# Patient Record
Sex: MALE | Race: WHITE
[De-identification: names, ages, dates, MRNs, and addresses within clinical notes are randomized per-mention and may not be internally consistent; named-entity substitution may affect disease eponyms.]

---

## 2018-07-02 ENCOUNTER — HOSPITAL ENCOUNTER (OUTPATIENT)
Dept: HOSPITAL 80 - FED | Age: 43
Setting detail: OBSERVATION
Discharge: HOME | End: 2018-07-02
Attending: INTERNAL MEDICINE | Admitting: INTERNAL MEDICINE
Payer: SELF-PAY

## 2018-07-02 VITALS — DIASTOLIC BLOOD PRESSURE: 74 MMHG | SYSTOLIC BLOOD PRESSURE: 124 MMHG

## 2018-07-02 DIAGNOSIS — F17.200: ICD-10-CM

## 2018-07-02 DIAGNOSIS — Z95.5: ICD-10-CM

## 2018-07-02 DIAGNOSIS — I11.0: ICD-10-CM

## 2018-07-02 DIAGNOSIS — E78.5: ICD-10-CM

## 2018-07-02 DIAGNOSIS — I25.110: Primary | ICD-10-CM

## 2018-07-02 LAB
INR PPP: 0.93 (ref 0.83–1.16)
PLATELET # BLD: 236 10^3/UL (ref 150–400)
PROTHROMBIN TIME: 12.7 SEC (ref 12–15)

## 2018-07-02 PROCEDURE — B2111ZZ FLUOROSCOPY OF MULTIPLE CORONARY ARTERIES USING LOW OSMOLAR CONTRAST: ICD-10-PCS | Performed by: INTERNAL MEDICINE

## 2018-07-02 PROCEDURE — B2151ZZ FLUOROSCOPY OF LEFT HEART USING LOW OSMOLAR CONTRAST: ICD-10-PCS | Performed by: INTERNAL MEDICINE

## 2018-07-02 PROCEDURE — 4A023N7 MEASUREMENT OF CARDIAC SAMPLING AND PRESSURE, LEFT HEART, PERCUTANEOUS APPROACH: ICD-10-PCS | Performed by: INTERNAL MEDICINE

## 2018-07-02 PROCEDURE — C1769 GUIDE WIRE: HCPCS

## 2018-07-02 NOTE — GHP
[f rep st]



                                                            HISTORY AND PHYSICAL





DATE OF ADMISSION:  07/02/2018



CHIEF COMPLAINT:  Chest pain.



HISTORY:  This is a 42-year-old man with a past medical history of coronary artery disease status pos
t stent approximately 11 years ago who presents with several weeks of increasing chest pain.  The pat
tosin notes that he has pain in his chest that feels like heartburn that also involves his left arm an
d jaw.  He notes this has been progressive over the last several weeks and much more intense yesterda
y.  He notes that this is very similar to the pain he had when he had his cardiac event many years ag
o though less intense.  He is currently working here, but originally from [place] where he has had t
he majority of his care.  He does note that some of his symptoms increased when he was exerting himse
lf hiking in the [place] near [facility].  He did note that the pain improved when he was able t
o sit.



PAST MEDICAL HISTORY:  Includes.

1.  Coronary artery disease.

2.  Hypertension.

3.  Hyperlipidemia.



PAST SURGICAL HISTORY:  Angiography and stent placement.



SOCIAL HISTORY:  Patient is a 2-pack-per-day smoker with a greater than 80 pack-year smoking history.
  He works as a .  He has 3 children, 2 of whom are here with him in [place].  He drinks beer 
daily, generally 3-4 beers per day.



FAMILY HISTORY:  Grandmother with cardiac disease.



REVIEW OF SYSTEMS:  10-point review of systems obtained, negative except as per HPI.



HOME MEDICATIONS:  Ibuprofen.



ALLERGIES:  No known drug allergies.



PHYSICAL EXAMINATION:  VITAL SIGNS:  /95, heart rate 72, respiratory rate 16, O2 sats 94% on ro
om air, temperature 36.7.  GENERAL APPEARANCE:  This is a well-developed, well-nourished man.  He is 
awake and alert.  He is in no acute distress.  EYES:  Anicteric.  HENT:  Oropharynx clear.  CARDIOVAS
CULAR:  Regular rate and rhythm, no MRG.  PULMONARY:  CTA bilaterally.  Normal work of breathing.  AB
DOMEN:  Soft, nontender, nondistended.  EXTREMITIES:  No clubbing, cyanosis, or edema.  SKIN:  Warm, 
dry, well perfused.  NEURO/PSYCH:  Oriented, appropriate, pleasant.



CLINICAL DATA:  Labs reviewed.  Troponin is negative.  White blood cell count is 11.7.  Chemistry is 
remarkable only for a glucose of 139. 



EKG personally reviewed and interpreted shows a normal sinus rhythm with Q-waves in II, III, aVF.



ASSESSMENT/PLAN:  This is a 42-year-old man with a past medical history of coronary artery disease pr
esenting with chest pain with radiation to left arm and jaw.

1.  Chest pain.  Unfortunately, this is his typical anginal symptoms, and given his history concernin
g for acute coronary syndrome, Cardiology has been consulted and is taking the patient for coronary a
ngiography.  He has not been on any cardiac medications for the last several years.  Further recommen
dations to follow pending his catheterization report.

2.  Hypertension.  The patient has mildly elevated blood pressure since arrival, again in the setting
 of being off all of his cardiac medications.  Will likely be restarted on beta-blocker therapy prior
 to discharge pending again cath report.

3.  Hyperglycemia.  The patient with concerns that he might have diabetes as he states he has issues 
when he has not eaten for some time.  We will check a hemoglobin A1c given his random glucose of 140.


4.  Tobacco use.  Recommending cessation.  Will provide nicotine patch and gum while inhouse.  

5.  Inpatient status given concerns for presentation consistent with acute coronary syndrome.  The yola pattonnt will require greater than 48-hour stay for evaluation and management of above. 



Patient is new to my care.  Care plan reviewed with the ER physician.





Job #:  [id number]

## 2018-07-02 NOTE — EDPHY
H & P


Stated Complaint: 2 weeks intermittent cp with hx cardiac stents/off blood 

thinners/ran out


Time Seen by Provider: 07/02/18 10:29





- Personal History


Current Tetanus Diphtheria and Acellular Pertussis (TDAP): Unsure





- Medical/Surgical History


Hx Asthma: No


Hx Chronic Respiratory Disease: No


Hx Diabetes: No


Hx Cardiac Disease: Yes


Hx Renal Disease: No


Hx Cirrhosis: No


Hx Alcoholism: No


Hx HIV/AIDS: No


Hx Splenectomy or Spleen Trauma: No


Other PMH: cardiac stent/htn off meds





- Social History


Smoking Status: Current every day smoker


Constitutional: 


 Initial Vital Signs











Temperature (C)  36.5 C   07/02/18 10:20


 


Heart Rate  85   07/02/18 10:20


 


Respiratory Rate  18   07/02/18 10:20


 


Blood Pressure  161/104 H  07/02/18 10:20


 


O2 Sat (%)  95   07/02/18 10:20








 











O2 Delivery Mode               Room Air














Allergies/Adverse Reactions: 


 





No Known Allergies Allergy (Unverified 07/02/18 10:20)


 








Home Medications: 














 Medication  Instructions  Recorded


 


Ibuprofen [Motrin (*)] 200 mg PO DAILY PRN 07/02/18














Medical Decision Making


ED Course/Re-evaluation: 





CHIEF COMPLAINT: Chest pain





HISTORY OF PRESENT ILLNESS:  


The patient is a 43 y/o male with a history of a right coronary artery cardiac 

stent complaining of intermittent chest pain for the past 2 weeks. In 2006 he 

had an angioplasty and cardiac stent placed after developing severe heartburn 

and left arm pain. Around 1 year ago he stopped taking Plavix, Metoprolol, and 

baby Aspirin as he ran out. Several days ago he developed mild heartburn and 

left arm pain. Yesterday he was hiking which caused the symptoms worsen; he 

also developed jaw pain. The pain typically occurs in the morning or while 

exerting himself. Currently he is in no pain. Denies headache, shortness of 

breath, abdominal pain, urinary or bowel complaints, fever, numbness, 

paresthesias.





REVIEW OF SYSTEMS:  





A 10 point review of systems was performed and is negative with the exception 

of the elements mentioned in the history of present illness.





PHYSICAL EXAM:  





HR, BP, O2 Sat, RR.  Temp noted


General Appearance:  Alert, well hydrated, appropriate, and non-toxic appearing.


Head:  Atraumatic without scalp tenderness or obvious injury


Eyes:  Pupils equal, round, reactive to light and accommodation, EOMI, no trauma

, no injection.


Ears:  Clear bilaterally, no perforation, normal landmarks


Nose:  Atraumatic, no rhinorrhea, clear.


Throat:  There is no erythema or exudates, no lesions, normal tonsils, mucus 

membranes moist.


Neck:  Supple, nontender, no lymphadenopathy.


Respiratory:  No retractions, no distress, no wheezes, and no accessory muscle 

use.  Lungs are clear to auscultation bilaterally.


Cardiovascular:  Regular rate and rhythm, no murmurs, rubs, or gallops. 

Bilateral carotid, radial, dorsalis pedis, and posterior tibial pulses intact. 

Good capillary refill all extremities.


Gastrointestinal:  Abdomen is soft, nontender, non-distended, no masses, no 

rebound, no guarding, no peritoneal signs.


Musculoskeletal:  Normal active ROM of all extremities, atraumatic.


Neurological:  Alert, appropriate, and interactive. Non-focal neuro.


Skin:  No rashes, good turgor, no nodules on palpation.





Past medical history: Hypertension


Past surgical history: Cardiac stent (placed in [place] in 2006), angioplasty


Family history: Denies


Social history: Originally from [place], employed, single





DIAGNOSTICS/PROCEDURES/CRITICAL CARE TIME:  





EKG: The 12 lead EKG was interpreted by myself as sinus rhythm with a rate of 78

, inferior infarct. See hard copy and/or "tracemaster" electronic copy for 

interpretation.





DIFFERENTIAL DIAGNOSIS:   


The differential diagnosis for the patient's chest pain included but was not 

limited to acute coronary syndrome, myocardial ischemia, pulmonary embolus, 

chest wall pain, pleural inflammation, and pulmonary infectious causes.





MEDICAL DECISION MAKING: 


The patient is a 43 y/o male with a history of a right coronary artery cardiac 

stent presenting with intermittent chest pain for the past 2 weeks. He has not 

been taken his prescriptions for the past year. Labs and EKG ordered; 324mg PO 

Aspirin administered. 





1030: I interpreted EKG as sinus rhythm with a rate of 78, inferior infarct.





1115: Patients troponin is negative.





1122: Consulted with hospitalist service, Dr. Palacios accepts admission of 

this patient to the PCU.





1127: Consulted with Dr. Fernandez, cardiologist, regarding this patient. He agrees 

to consult on this patient during his admission.





1145: Reassessed patient and discussed laboratory and imaging studies. He is 

comfortable with plan for admission.





1215: Consulted with Dr. Fernandez regarding patient who most likely has an 

unstable angina.








- Data Points


Laboratory Results: 


 Laboratory Results





 07/02/18 10:40 





 07/02/18 10:40 





 











  07/02/18 07/02/18 07/02/18





  10:59 10:40 10:40


 


WBC      11.17 10^3/uL H 10^3/uL





     (3.80-9.50) 


 


RBC      5.39 10^6/uL 10^6/uL





     (4.40-6.38) 


 


Hgb      17.3 g/dL g/dL





     (13.7-17.5) 


 


Hct      49.8 % %





     (40.0-51.0) 


 


MCV      92.4 fL fL





     (81.5-99.8) 


 


MCH      32.1 pg pg





     (27.9-34.1) 


 


MCHC      34.7 g/dL g/dL





     (32.4-36.7) 


 


RDW      13.0 % %





     (11.5-15.2) 


 


Plt Count      236 10^3/uL 10^3/uL





     (150-400) 


 


MPV      10.0 fL fL





     (8.7-11.7) 


 


Neut % (Auto)      65.5 % %





     (39.3-74.2) 


 


Lymph % (Auto)      21.9 % %





     (15.0-45.0) 


 


Mono % (Auto)      8.1 % %





     (4.5-13.0) 


 


Eos % (Auto)      2.9 % %





     (0.6-7.6) 


 


Baso % (Auto)      0.9 % %





     (0.3-1.7) 


 


Nucleat RBC Rel Count      0.0 % %





     (0.0-0.2) 


 


Absolute Neuts (auto)      7.31 10^3/uL H 10^3/uL





     (1.70-6.50) 


 


Absolute Lymphs (auto)      2.45 10^3/uL 10^3/uL





     (1.00-3.00) 


 


Absolute Monos (auto)      0.91 10^3/uL H 10^3/uL





     (0.30-0.80) 


 


Absolute Eos (auto)      0.32 10^3/uL 10^3/uL





     (0.03-0.40) 


 


Absolute Basos (auto)      0.10 10^3/uL 10^3/uL





     (0.02-0.10) 


 


Absolute Nucleated RBC      0.00 10^3/uL 10^3/uL





     (0-0.01) 


 


Immature Gran %      0.7 % %





     (0.0-1.1) 


 


Immature Gran #      0.08 10^3/uL 10^3/uL





     (0.00-0.10) 


 


Sodium    136 mEq/L mEq/L  





    (135-145)  


 


Potassium    4.1 mEq/L mEq/L  





    (3.3-5.0)  


 


Chloride    103 mEq/L mEq/L  





    ()  


 


Carbon Dioxide    21 mEq/l L mEq/l  





    (22-31)  


 


Anion Gap    12 mEq/L mEq/L  





    (8-16)  


 


BUN    7 mg/dL mg/dL  





    (7-23)  


 


Creatinine    0.9 mg/dL mg/dL  





    (0.7-1.3)  


 


Estimated GFR    > 60   





    


 


Glucose    139 mg/dL H mg/dL  





    ()  


 


Calcium    9.8 mg/dL mg/dL  





    (8.5-10.4)  


 


POC Troponin I  0.01 ng/mL ng/mL    





   (0.00-0.08)   


 


NT-Pro-B Natriuret Pep    51 pg/mL pg/mL  





    (0-125)  











Medications Given: 


 








Discontinued Medications





Aspirin (Aspirin)  324 mg PO EDNOW ONE


   Stop: 07/02/18 11:04


   Last Admin: 07/02/18 11:04 Dose:  324 mg





Point of Care Test Results: 


 Chemistry











  07/02/18





  10:59


 


POC Troponin I  0.01 ng/mL ng/mL





   (0.00-0.08) 














Departure





- Departure


Disposition: [facility] Inpatient Acute


Clinical Impression: 


 Acute coronary syndrome





Chest pain


Qualifiers:


 Chest pain type: other chest pain Qualified Code(s): R07.89 - Other chest pain





Condition: Fair


Referrals: 


NONE *PRIMARY CARE P,. [Primary Care Provider] - As per Instructions


Report Scribed for: Tj Mejia


Report Scribed by: Nelia Jolly


Date of Report: 07/02/18


Time of Report: 10:33

## 2018-07-02 NOTE — CPEKG
Heart Rate: 78

RR Interval: 769

P-R Interval: 172

QRSD Interval: 84

QT Interval: 380

QTC Interval: 433

P Axis: 51

QRS Axis: 7

T Wave Axis: 10

EKG Severity - ABNORMAL ECG -

EKG Impression: SINUS RHYTHM

EKG Impression: INFERIOR INFARCT, AGE INDETERMINATE

Electronically Signed By: Tj Mejia 02-Jul-2018 13:15:52

## 2018-07-02 NOTE — PDCARCONS
Cardiology Consult


Reason for Consult: Known CAD, current presentation with chest discomfort.


Chief Complaint: "Heartburn.


Requesting Physician: Dr. Tj Mejia.


History of Present Illness: 





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 

area.  He has known coronary artery disease.  Back in December of 2006 he 

presented with an inferior infarct in initially underwent balloon angioplasty 

of the right coronary artery.  Approximately 3 months later he was brought back 

and underwent coronary stenting of the same vessel.  He has done well since 

then.  Unfortunately, he continues to smoke upwards of 2 packs of cigarettes 

daily.  Currently smoking about a half a pack a day.  Additionally, about 2 

years ago, he stopped taking his medications.





He has been in the local area here for about a month.  Shortly after arrival he 

noted that his breathing was not as good as it usually is back in [place].  He 

was experiencing modest symptoms of exertional dyspnea.  Additionally, he was 

experiencing intermittent episodes of "heartburn."These are described as 

burning precordial chest discomfort episodes that last anywhere from 30-60 

minutes.  These would occur on a daily basis and were associated with radiation 

of this discomfort both to the jaw and throat as well as to the left upper 

extremity.  Usually these resolved spontaneously.  About a week ago he 

experienced a holiday from these symptoms.  These symptoms began again on 

Friday.  He noted a particularly bothersome episode this last Sunday when he 

was hiking with his children.  They were walking around to trail near [facility] when he experienced similar symptoms.  He was able to complete the 

walk and went back to his truck where he sat and rested until his symptoms 

resolved.  He had another episode this morning which was similar to the other 

events.  As result he came to the emergency department.





With these episodes he had no symptoms of shortness of breath, nausea, vomiting 

or diaphoresis.  The symptoms are similar to those that he experienced back in 

December of 2006 at the time of his myocardial infarction.  He has not had any 

dizziness or lightheadedness.  He notes no episodes of palpitations.  He has 

not had any lower extremity swelling.  The pain is not respiratory phasic.  

There is no history of fever, chills or sweats.  He has not had cough, sputum 

production or hemoptysis.  The pain was not described as ripping or tearing.





On arrival to the emergency department he was hemodynamically stable, pain-free 

and had an ECG without dynamic ST/T changes.





History Information





- Allergies/Home Medication List


Allergies/Adverse Reactions: 








No Known Allergies Allergy (Unverified 07/02/18 10:20)


 





Home Medications: 








Ibuprofen [Motrin (*)] 200 mg PO DAILY PRN 07/02/18 [Last Taken 07/01/18]





I have personally reviewed and updated: family history, medical history, social 

history, surgical history


Past Medical History: 


Coronary artery disease, hypertension, hyperlipidemia.





- Surgical History


Additional surgical history: None significant





- Social History


Smoking Status: Current every day smoker


Alcohol Use: Other (He drinks 3-4 beers on a daily basis.  He does not use hard 

liquor.  He has not used drugs in over 10 years.)





Cardiac History





- Cardiac History


Past Cardiac History: CAD, PCI





Physical Exam


Physical Exam: 

















Temp Pulse Resp BP Pulse Ox


 


 36.7 C   72   16   147/95 H  94 


 


 07/02/18 13:13  07/02/18 13:13  07/02/18 13:13  07/02/18 13:13  07/02/18 13:13











Constitutional: no apparent distress, appears nourished, not in pain


Eyes: PERRL, anicteric sclera, EOMI


Ears, Nose, Mouth, Throat: moist mucous membranes, hearing normal, ears appear 

normal, no oral mucosal ulcers


Cardiovascular: regular rate and rhythym, no murmur, rub, or gallop, No edema


Respiratory: no respiratory distress, no rales or rhonchi, clear to auscultation


Gastrointestinal: normoactive bowel sounds, soft, non-tender abdomen, no 

palpable masses


Genitourinary: no bladder fullness, no bladder tenderness


Skin: warm, normal color, no rashes or abrasions, no fluctuance, no induration, 

No mottled


Musculoskeletal: full muscle strength, no muscle tenderness, normal joint ROM, 

no joint effusions


Psychiatric: interacting appropriately, not anxious, not encephalopathic, 

thought process linear


Lymph, Heme, Immunologic: no cervical LAD, no supraclavicular LAD





Lab and Imaging





 07/02/18 10:40





 07/02/18 10:40














WBC  11.17 10^3/uL (3.80-9.50)  H  07/02/18  10:40    


 


RBC  5.39 10^6/uL (4.40-6.38)   07/02/18  10:40    


 


Hgb  17.3 g/dL (13.7-17.5)   07/02/18  10:40    


 


Hct  49.8 % (40.0-51.0)   07/02/18  10:40    


 


MCV  92.4 fL (81.5-99.8)   07/02/18  10:40    


 


MCH  32.1 pg (27.9-34.1)   07/02/18  10:40    


 


MCHC  34.7 g/dL (32.4-36.7)   07/02/18  10:40    


 


RDW  13.0 % (11.5-15.2)   07/02/18  10:40    


 


Plt Count  236 10^3/uL (150-400)   07/02/18  10:40    


 


MPV  10.0 fL (8.7-11.7)   07/02/18  10:40    


 


Neut % (Auto)  65.5 % (39.3-74.2)   07/02/18  10:40    


 


Lymph % (Auto)  21.9 % (15.0-45.0)   07/02/18  10:40    


 


Mono % (Auto)  8.1 % (4.5-13.0)   07/02/18  10:40    


 


Eos % (Auto)  2.9 % (0.6-7.6)   07/02/18  10:40    


 


Baso % (Auto)  0.9 % (0.3-1.7)   07/02/18  10:40    


 


Nucleat RBC Rel Count  0.0 % (0.0-0.2)   07/02/18  10:40    


 


Absolute Neuts (auto)  7.31 10^3/uL (1.70-6.50)  H  07/02/18  10:40    


 


Absolute Lymphs (auto)  2.45 10^3/uL (1.00-3.00)   07/02/18  10:40    


 


Absolute Monos (auto)  0.91 10^3/uL (0.30-0.80)  H  07/02/18  10:40    


 


Absolute Eos (auto)  0.32 10^3/uL (0.03-0.40)   07/02/18  10:40    


 


Absolute Basos (auto)  0.10 10^3/uL (0.02-0.10)   07/02/18  10:40    


 


Absolute Nucleated RBC  0.00 10^3/uL (0-0.01)   07/02/18  10:40    


 


Immature Gran %  0.7 % (0.0-1.1)   07/02/18  10:40    


 


Immature Gran #  0.08 10^3/uL (0.00-0.10)   07/02/18  10:40    


 


Sodium  136 mEq/L (135-145)   07/02/18  10:40    


 


Potassium  4.1 mEq/L (3.3-5.0)   07/02/18  10:40    


 


Chloride  103 mEq/L ()   07/02/18  10:40    


 


Carbon Dioxide  21 mEq/l (22-31)  L  07/02/18  10:40    


 


Anion Gap  12 mEq/L (8-16)   07/02/18  10:40    


 


BUN  7 mg/dL (7-23)   07/02/18  10:40    


 


Creatinine  0.9 mg/dL (0.7-1.3)   07/02/18  10:40    


 


Estimated GFR  > 60   07/02/18  10:40    


 


Glucose  139 mg/dL ()  H  07/02/18  10:40    


 


Calcium  9.8 mg/dL (8.5-10.4)   07/02/18  10:40    


 


POC Troponin I  0.01 ng/mL (0.00-0.08)   07/02/18  10:59    


 


NT-Pro-B Natriuret Pep  51 pg/mL (0-125)   07/02/18  10:40    








Visualized and Interpreted Chest x-ray results: Yes


Chest X-ray Interpretation: no infiltrate, normal


Visualized and Interpreted imaging results: Yes


Interpretation: Normal sinus rhythm, prior inferior infarct age undetermined.


Visualized and Interpreted EKG results: Yes


Telemetry: Normal sinus rhythm.


Echocardiogram: 





Not performed.





A/P


Assessment: 


42-year-old male with known premature atherosclerosis who suffered a myocardial 

infarction back in December of 2006. At that time, he underwent PCI of the 

right coronary artery.  Since then, unfortunately, he continues to smoke 

heavily and has not used his prescribed medications for the last several years.

  He presents now with very concerning symptoms for unstable angina pectoris.  

Fortunately, at the present time, he is pain-free and his ECG has not indicated 

any dynamic changes.  His initial troponins are negative.  


Plan: 


Because of his clinical presentation with unstable symptoms and known premature 

CAD he and I discussed possible options for further diagnosis and treatment.  

We decided to proceed with coronary angiography.  The risks, benefits and 

alternatives were discussed.  Further recommendations will be made pending the 

outcome of that study.





Review of Systems


Review of Systems: 








- Review of Systems


Constitutional: no symptoms reported


EENTM: no symptoms reported


Respiratory: see HPI


Cardiac: see HPI


Gastrointestinal/Abdominal: see HPI


Genitourinary: no symptoms


Musculoskelatal: no symptoms


Skin: no symptoms


Neurological: no symptoms


Hematologic/Lymphatic: no symptoms reported


Immunologic/allergic: no symptoms reported


All Other Systems: Reviewed and Negative

## 2018-07-02 NOTE — PDDXCAT
Diagnostic Cath Note





- .


Date: 07/02/18


: White


Indication: other (Coronary artery disease, unstable angina pectoris.)





- Procedure


Access: right wrist


Procedure: left heart catheterization, coronary angiography, left ventriculogram





- Materials


Left Heart Cath size: 5F


Left Heart Cath materials: JL3.5, JR4.0, pigtail





- Findings-Left Heart Catheterization


LM: Normal.


LAD: Moderate caliber transapical vessel which becomes small caliber and 

diffusely diseased distally.  There is a single principal diagonal branch 

identified.


LCX: Moderate caliber vessel.  Becomes diminutive after a single principal 

obtuse marginal branch.  This obtuse marginal contains an eccentric 50% lesion.

  There is a severely diseased very small proximal obtuse marginal branch that 

is surgically and interventionally inconsequential.


RCA: Dominant.  The PDA and 3 posterolateral branches are identified.  A widely 

patent stent is noted in the proximal vessel.  The distal vessel following the 

PDA and between the 1st posterolateral branch is severely diseased with 

multiple lesions up to 50-75%.  This is a very small caliber vessel.


LVEF: 55-60%.


Wall motion: Mild basal diaphragmatic hypokinesis.


Complications: None.


Estimated blood loss: <50ml


Closure method: TR Band


Assessment: Known coronary artery disease with a widely patent stent 

appreciated in the proximal right coronary artery.  The patient does have high-

grade branch vessel disease as described above that could be contributing to 

symptoms of angina.  Preserved left ventricular systolic function with mild 

basal diaphragmatic hypokinesis.


Plan: 





Patient will be treated with maximal medical therapy.


Intervention: 





None.


Patient Problems: 


 Problems











Problem Status Onset


 


Acute coronary syndrome Acute  


 


Chest pain Acute

## 2018-07-07 NOTE — GDS
[f rep st]



                                                             DISCHARGE SUMMARY





DISCHARGE DIAGNOSES:  

1.  Chest discomfort consistent with angina.  

2.  Coronary artery disease.  

3.  Hypertension. 

4.  Hyperlipidemia.



HOSPITAL COURSE:  The patient was seen and admitted through the emergency department with symptoms of
 chest discomfort concerning for angina.  He has a history of known coronary artery disease with prev
ious RCA territory infarct and PCI.  Because of his limiting symptoms, he underwent coronary angiogra
phy.  There is a full and separately dictated report noted on the chart.  His disease was essentially
 branch vessel disease thought best served by medical management.  As a result, he was started back o
n his medications to include baby aspirin, atorvastatin 40 mg daily, and metoprolol 25 mg twice daily
.  At time of discharge, he was up ambulating on the biggs, feeling well without chest discomfort.



PERTINENT LABORATORIES DURING THIS ADMISSION:  His total cholesterol was 211, triglycerides 468, LDL 
not calculated, HDL 34.  Hemoglobin A1c 6.4.



HOME MEDICATIONS:  

1.  Low-dose aspirin.  

2.  Atorvastatin 40 mg daily.  

3.  Metoprolol 25 mg twice daily.



FOLLOWUP:  He will follow with myself at [facility] within the next 1-2 weeks.





Job #:  [id number]